# Patient Record
Sex: MALE | Race: WHITE
[De-identification: names, ages, dates, MRNs, and addresses within clinical notes are randomized per-mention and may not be internally consistent; named-entity substitution may affect disease eponyms.]

---

## 2018-08-11 ENCOUNTER — HOSPITAL ENCOUNTER (INPATIENT)
Dept: HOSPITAL 68 - ERH | Age: 73
LOS: 6 days | Discharge: HOME HEALTH | DRG: 854 | End: 2018-08-17
Admitting: INTERNAL MEDICINE
Payer: COMMERCIAL

## 2018-08-11 VITALS — HEIGHT: 70 IN | BODY MASS INDEX: 35.97 KG/M2 | WEIGHT: 251.25 LBS

## 2018-08-11 VITALS — DIASTOLIC BLOOD PRESSURE: 60 MMHG | SYSTOLIC BLOOD PRESSURE: 150 MMHG

## 2018-08-11 VITALS — SYSTOLIC BLOOD PRESSURE: 170 MMHG | DIASTOLIC BLOOD PRESSURE: 54 MMHG

## 2018-08-11 VITALS — DIASTOLIC BLOOD PRESSURE: 70 MMHG | SYSTOLIC BLOOD PRESSURE: 182 MMHG

## 2018-08-11 VITALS — SYSTOLIC BLOOD PRESSURE: 176 MMHG | DIASTOLIC BLOOD PRESSURE: 72 MMHG

## 2018-08-11 DIAGNOSIS — B95.61: ICD-10-CM

## 2018-08-11 DIAGNOSIS — G62.9: ICD-10-CM

## 2018-08-11 DIAGNOSIS — J45.909: ICD-10-CM

## 2018-08-11 DIAGNOSIS — Z79.84: ICD-10-CM

## 2018-08-11 DIAGNOSIS — D64.9: ICD-10-CM

## 2018-08-11 DIAGNOSIS — I11.0: ICD-10-CM

## 2018-08-11 DIAGNOSIS — F11.20: ICD-10-CM

## 2018-08-11 DIAGNOSIS — L02.11: ICD-10-CM

## 2018-08-11 DIAGNOSIS — I50.30: ICD-10-CM

## 2018-08-11 DIAGNOSIS — T40.2X5D: ICD-10-CM

## 2018-08-11 DIAGNOSIS — E11.9: ICD-10-CM

## 2018-08-11 DIAGNOSIS — M54.9: ICD-10-CM

## 2018-08-11 DIAGNOSIS — K59.03: ICD-10-CM

## 2018-08-11 DIAGNOSIS — A41.9: Primary | ICD-10-CM

## 2018-08-11 DIAGNOSIS — L03.221: ICD-10-CM

## 2018-08-11 DIAGNOSIS — E78.5: ICD-10-CM

## 2018-08-11 DIAGNOSIS — G89.29: ICD-10-CM

## 2018-08-11 LAB
ABSOLUTE GRANULOCYTE CT: 19.7 /CUMM (ref 1.4–6.5)
BASOPHILS # BLD: 0 /CUMM (ref 0–0.2)
BASOPHILS NFR BLD: 0.1 % (ref 0–2)
EOSINOPHIL # BLD: 0 /CUMM (ref 0–0.7)
EOSINOPHIL NFR BLD: 0.2 % (ref 0–5)
ERYTHROCYTE [DISTWIDTH] IN BLOOD BY AUTOMATED COUNT: 14.5 % (ref 11.5–14.5)
GRANULOCYTES NFR BLD: 88.9 % (ref 42.2–75.2)
HCT VFR BLD CALC: 41.9 % (ref 42–52)
LYMPHOCYTES # BLD: 1.2 /CUMM (ref 1.2–3.4)
MCH RBC QN AUTO: 28.4 PG (ref 27–31)
MCHC RBC AUTO-ENTMCNC: 33.3 G/DL (ref 33–37)
MCV RBC AUTO: 85.1 FL (ref 80–94)
MONOCYTES # BLD: 1.2 /CUMM (ref 0.1–0.6)
PLATELET # BLD: 339 /CUMM (ref 130–400)
PMV BLD AUTO: 7.4 FL (ref 7.4–10.4)
RED BLOOD CELL CT: 4.93 /CUMM (ref 4.7–6.1)
WBC # BLD AUTO: 22.2 /CUMM (ref 4.8–10.8)

## 2018-08-11 PROCEDURE — 2NBSP: CPT

## 2018-08-11 NOTE — RADIOLOGY REPORT
EXAMINATION:
XR PORTABLE CHEST
 
CLINICAL INFORMATION:
Question pneumonia. Elevated white blood cell count
 
COMPARISON:
12/23/2014
 
TECHNIQUE:
Portable frontal view of the chest was obtained.
 
FINDINGS: Limited examination by technique. Lordotic positioning. The right
costophrenic angle is excluded from the field-of-view.
The cardiac silhouette remains mildly enlarged.  There is slight elevation of
the right hemidiaphragm is partially obscured by the opacity on today's
examination. Otherwise, the lungs are clear. The left costophrenic angle
appears relatively sharp.
 
IMPRESSION:
 
1. Study limited by technique.
2. Possible pneumonia developing in the right lung base. Otherwise, clear
lungs.
3. Stable mild cardiomegaly.

## 2018-08-11 NOTE — ADMISSION CERTIFICATION
Admission Certification
 
Certification Statement
- As attending physician, I certify that at the time of
- admission, based on clinical presentation, severity of
- symptoms, need for further diagnostic testing and
- therapeutic interventions, and risk of adverse outcomes
- without in-hospital treatment, in my clinical assessment,
- this patient requires an acute hospital stay for a minimum
- of two nights or longer. I have also considered psychsocial
- factors such as support system, advanced age, financial
- issues, cognitive issues, and failed out-patient treatments,
- past re-admission history, safety of patient, and lack of
- compliance as applicable.
Specific rationale supporting this admission is:
Neck cellulitis failing outpatient treatment

## 2018-08-11 NOTE — ED GENERAL ADULT
History of Present Illness
 
General
Chief Complaint: Skin Rash/ Abcess
Stated Complaint: NECK PAIN, RED,WARM TO THE TOUCH.
Source: patient, family
Exam Limitations: no limitations
 
Vital Signs & Intake/Output
Vital Signs & Intake/Output
 Vital Signs
 
 
Date Time Temp Pulse Resp B/P B/P Pulse O2 O2 Flow FiO2
 
     Mean Ox Delivery Rate 
 
 1213 99.1 96 20 135/68  96 Room Air  
 
 
 
Allergies
Coded Allergies:
Penicillins (Intermediate, COUGH AND CONGESTION 18)
codeine (Intermediate, RASH 18)
 
Reconcile Medications
Amlodipine (Norvasc 5MG Tab) 5 MG TABLET   0.5 TAB PO DAILY HTN  (Reported)
Aspirin 81 MG TAB.CHEW   1 TAB PO DAILY HEART HEALTH  (Reported)
Atorvastatin Calcium (Lipitor) 10 MG TABLET   1 TAB PO DAILY CHOLESTEROL  (
Reported)
Diazepam 5 MG TABLET   2 TAB PO DAILY CHRONIC PAIN  (Reported)
Fenofibric Acid (Trilipix 135 MG CAP) 135 MG CAPSULE.DR   145 MG PO DAILY 
CHOLESTEROL  (Reported)
Furosemide (Lasix) 40 MG TABLET   40 MG PO Q12 HEART FAILURE
Glyburide 2.5 MG TABLET   2 TAB PO DAILY DAIBETES  (Reported)
LOSARTAN/HYDROCHLOROTHIAZIDE (Hyzaar 100-25 Tablet) 1 EACH TABLET   1 TAB PO 
DAILY HTN  (Reported)
Magnesium Oxide 400 MG TABLET   1 TAB PO DAILY SUPPLEMENT  (Reported)
METFORMIN HCL (Metformin) 1,000 MG TABLET   1 TAB PO BID DIABETES  (Reported)
Metoprolol Tartrate (Lopressor) 50 MG TAB   50 MG PO QAM HTN  (Reported)
Metoprolol Tartrate (Lopressor) 25 MG TABLET   25 MG PO 1700 HEART  (Reported)
Mometasone Furoate (Nasonex) 0.05 MG/Actuation SPR   2 PUF LISA DAILY ALLERGY  (
Reported)
Niacin 500 MG TAB   1,000 MG PO DAILY SUPPLEMENT  (Reported)
Omega-3-Acid Ethyl Esters (Lovaza) 1 GRAM CAPSULE   2 CAP PO BID SUPPLEMENT  (
Reported)
Omeprazole 20 MG CAPSULE.DR   1 TAB PO DAILY HEARTBURN  (Reported)
Oxycodone Cr (OxyContin) 20 MG TAB.ER.12H   1 TAB PO BID PAIN  (Reported)
OXYCODONE HCL (Oxycodone Hydrochloride) 10 MG TABLET   1 TAB PO BID PRN PAIN  (
Reported)
SENNOSIDES (Senokot) 8.6 MG TABLET   2 TAB PO QHS CONSTIPATION  (Reported)
Sildenafil Citrate (Viagra) 100 MG TAB   100 MG PO PRN AS NEEDED  (Reported)
 
Triage Note:
PT TO ED WITH WIFE FOR C/O CELLULITIS TO BACK OF
 NECK. PT CURRENTLY ON ABX FOR SAME.
 WIFE STATES PT HAS BEEN CONFUSED AS WELL.
 LARGE RED, WARM TO TOUCH AREA NOTED TO NECK.
 TEMP 99.1.
Triage Nurses Notes Reviewed? yes
HPI:
Patient noticed a red and painful area to the back of his neck last weekend.  
The redness hasn't spread.  He went to his dermatologist on Thursday and was 
diagnosed with cellulitis aspirin on Bactrim.  Patient has been taking the 
Bactrim however he began 1 fevers and the redness is worsening.  He describes a 
throbbing pain in the area where the redness is.  There is no pain outside of 
the reddened area.  There is no difficulty breathing or swallowing.  There is no
headache.
 
Past History
 
Travel History
Traveled to [place] past 21 day No
 
Medical History
Any Pertinent Medical History? see below for history
Cardiovascular: CHF, hypertension, hyperlipidemia
Respiratory: asthma
Gastrointestinal: GERD
Musculoskeletal: chronic back pain
Endocrine: diabetes
History of MRSA: No
History of VRE: No
History of CDIFF: No
Pneumonia Vaccine: 10/01/13
Influenza Vaccine: 06
Tetanus Vaccine: 13
 
Surgical History
Surgical History: non-contributory
 
Psychosocial History
Who do you live with Spouse
What is your primary language English
Tobacco Use: Quit >30 days ago
ETOH Use: denies use
Illicit Drug Use: denies illicit drug use
 
Family History
Family History, If Any:
MOTHER (Stroke). , Age 60+.
BROTHER (HTN).
FATHER (Heart Issues).
 
Hx Contributory? No
 
Review of Systems
 
Review of Systems
Constitutional:
Reports: see HPI, chills, fever. 
EENTM:
Reports: no symptoms. 
Respiratory:
Reports: no symptoms. 
Cardiovascular:
Reports: no symptoms. 
GI:
Reports: no symptoms. 
Genitourinary:
Reports: no symptoms. 
Musculoskeletal:
Reports: no symptoms. 
Skin:
Reports: see HPI. 
Neurological/Psychological:
Reports: no symptoms. 
Hematologic/Endocrine:
Reports: no symptoms. 
Immunologic/Allergic:
Reports: no symptoms. 
All Other Systems: Reviewed and Negative
 
Physical Exam
 
Physical Exam
General Appearance: well developed/nourished, alert, awake, mild distress
Head: atraumatic, normal appearance
Eyes:
Bilateral: PERRL, EOMI. 
Ears, Nose, Throat: normal pharynx, normal ENT inspection, hearing grossly 
normal
Neck: ERYTHEMA AND TENDER TO TOUCH
Respiratory: normal breath sounds, chest non-tender, no respiratory distress, 
lungs clear
Cardiovascular: regular rate/rhythm, normal peripheral pulses
Gastrointestinal: normal bowel sounds, soft, non-tender, no organomegaly
Back: normal inspection, normal range of motion
Extremities: normal inspection, normal capillary refill, normal range of motion,
no edema
Neurologic/Psych: no motor/sensory deficits, awake, alert, oriented x 3, normal 
gait, normal mood/affect
Skin: intact, normal color, warm/dry
 
Core Measures
ACS in differential dx? No
CVA/TIA Diagnosis: No
Sepsis Present: Yes
Sepsis Focused Exam Completed? Yes
 
ED Sepsis Exam
Date of Focused Sepsis Exam: 18
Time of Focused Sepsis Exam: 1405
Sepsis Cardiac Exam: Regular Rate/Rhythm
Sepsis Resp Exam: CTA
Sepsis Cap Refill Exam: <2 Sec
Sepsis Peripheral Pulse Exam: Normal
Sepsis Peripheral Pulse Location: Radial
Sepsis Skin Color Exam: Normal for Ethnicity
Skin Temp/Moisture Exam: Warm/Dry
 
Progress
Differential Diagnoses
I considered the following diagnoses in my evaluation of the patient: [
Cellulitis failed outpatient antibiotics]
 
Plan of Care:
 Orders
 
 
Procedure Date/time Status
 
LACTIC ACID  1516 Active
 
XRY-PORTABLE CHEST XRAY  1402 Active
 
EKG  1402 Active
 
BLOOD CULTURE  1216 Active
 
LACTIC ACID  1216 Complete
 
WESTERGREN SED RATE  1216 Complete
 
C-REACTIVE PROTEIN  1216 Complete
 
COMPREHENSIVE METABOLIC PANEL  1216 Complete
 
CBC WITHOUT DIFFERENTIAL  1216 Complete
 
 
 Laboratory Tests
 
 
18 1240:
Anion Gap 10, Estimated GFR > 60, BUN/Creatinine Ratio 21.0, Glucose 121  H, 
Lactic Acid 1.0, Calcium 9.3, Total Bilirubin 0.7, AST 18, ALT 31, Alkaline 
Phosphatase 79, C-Reactive Prot, Quant > 9.0  H, Total Protein 6.6, Albumin 3.6,
Globulin 3.0, Albumin/Globulin Ratio 1.2, CBC w Diff MAN DIFF ORDERED, RBC 4.93,
MCV 85.1, MCH 28.4, MCHC 33.3, RDW 14.5, MPV 7.4, Gran % 88.9  H, Lymphocytes % 
5.3  L, Monocytes % 5.5, Eosinophils % 0.2, Basophils % 0.1, Absolute 
Granulocytes 19.7  H, Absolute Lymphocytes 1.2, Absolute Monocytes 1.2  H, 
Absolute Eosinophils 0, Absolute Basophils 0, Normocytic RBCs VERIFIED, 
Normochromic RBCs VERIFIED, ESR Westergren 45  H
 Microbiology
 1356  BLOOD: Blood Culture - RECD
 1240  BLOOD: Blood Culture - RECD
 
 
Initial ED EKG: NSR, RBBB, nonspecific ST T wave chg
Prior EKG: unchanged
 
Departure
 
Departure
Disposition: STILL A PATIENT
Condition: Stable
Clinical Impression
Primary Impression: Cellulitis
Referrals:
Crispin Perdue MD (PCP/Family)
 
Departure Forms:
Customer Survey
General Discharge Information
 
Admission Note
Spoke With:
Guillermo Burgos MD
Documentation of Exam:
Documentation of any treatments & extenuating circumstances including Concerns 
Regarding Discharge (functional status, medication knowledge or non-compliance, 
living conditions, etc.) that warrant an admission rather than observation: [
Patient has been on antibiotics for 2 days for cellulitis to the back of his 
neck.  He continues to run fevers and the redness is worsening.  His continues 
to have the throbbing pain.  White count 22,000.  At this point he will require 
admission for IV antibiotics, ID consultation]
 
 
Critical Care Note
 
Critical Care Note
Critical Care Time: non-applicable

## 2018-08-11 NOTE — SEPSIS EVENT NOTE
**See Addendum**
Sepsis Event Note
 
Severe Sepsis
Severe Sepsis Present: No
 
Septic Shock
Septic Shock Present: No
 
Sepsis Focused Exam
Sepsis Cardiac Exam: Tachycardia
Sepsis Resp Exam: CTA
Sepsis Cap Refill Exam: <2 Sec
Sepsis Peripheral Pulse Exam: Normal
Sepsis Peripheral Pulse Location: Radial
Sepsis Skin Exam (color): Normal for Ethnicity
Skin Temp/Moisture Exam: Warm/Dry

## 2018-08-12 VITALS — SYSTOLIC BLOOD PRESSURE: 136 MMHG | DIASTOLIC BLOOD PRESSURE: 62 MMHG

## 2018-08-12 VITALS — DIASTOLIC BLOOD PRESSURE: 70 MMHG | SYSTOLIC BLOOD PRESSURE: 150 MMHG

## 2018-08-12 VITALS — DIASTOLIC BLOOD PRESSURE: 102 MMHG | SYSTOLIC BLOOD PRESSURE: 160 MMHG

## 2018-08-12 VITALS — SYSTOLIC BLOOD PRESSURE: 154 MMHG | DIASTOLIC BLOOD PRESSURE: 56 MMHG

## 2018-08-12 LAB
ABSOLUTE GRANULOCYTE CT: 15.9 /CUMM (ref 1.4–6.5)
BASOPHILS # BLD: 0.1 /CUMM (ref 0–0.2)
BASOPHILS NFR BLD: 0.5 % (ref 0–2)
EOSINOPHIL # BLD: 0.1 /CUMM (ref 0–0.7)
EOSINOPHIL NFR BLD: 0.8 % (ref 0–5)
ERYTHROCYTE [DISTWIDTH] IN BLOOD BY AUTOMATED COUNT: 14.4 % (ref 11.5–14.5)
GRANULOCYTES NFR BLD: 84.6 % (ref 42.2–75.2)
HCT VFR BLD CALC: 39.5 % (ref 42–52)
LYMPHOCYTES # BLD: 1.1 /CUMM (ref 1.2–3.4)
MCH RBC QN AUTO: 28.6 PG (ref 27–31)
MCHC RBC AUTO-ENTMCNC: 33.4 G/DL (ref 33–37)
MCV RBC AUTO: 85.6 FL (ref 80–94)
MONOCYTES # BLD: 1.5 /CUMM (ref 0.1–0.6)
PLATELET # BLD: 293 /CUMM (ref 130–400)
PMV BLD AUTO: 7.6 FL (ref 7.4–10.4)
RED BLOOD CELL CT: 4.62 /CUMM (ref 4.7–6.1)
WBC # BLD AUTO: 18.8 /CUMM (ref 4.8–10.8)

## 2018-08-12 PROCEDURE — 0J950ZZ DRAINAGE OF LEFT NECK SUBCUTANEOUS TISSUE AND FASCIA, OPEN APPROACH: ICD-10-PCS | Performed by: SURGERY

## 2018-08-12 NOTE — PN- HOUSESTAFF
**See Addendum**
Subjective
Follow-up For:
Neck cellulitis
Subjective:
Patient was seen and examined at bedside. He was alert and oriented but in 
moderate distress. He says the pain and the swelling is worse. The pain is 
throbbing and is the worst it has been since it started. He also complains of a 
throbbing  headache. He says the pain is a "15" as of now. The patient does have
a coded Penicillin yesterday. However, on interviewing the patient yesterday as 
well as today, he said that he did not have a rash or breathing issues with the 
Penicilllin. Last night, the patient complained of a "cold band like sensation" 
around his head. He denies doing cold compresses at that tiime. He was concerned
if it was an allergic reaction.  He was given Benadryl and slept well throughout
the night. He has no residual symptoms at this time.
 
Review of Systems
Constitutional:
Reports: see HPI. 
 
Objective
Last 24 Hrs of Vital Signs/I&O
 Vital Signs
 
 
Date Time Temp Pulse Resp B/P B/P Pulse O2 O2 Flow FiO2
 
     Mean Ox Delivery Rate 
 
08/12 0621 98.6 88 20 154/56  94   
 
08/11 2228    170/54     
 
08/11 2203 100.7 105 19 182/70  96 Room Air  
 
08/11 2132  104  176/72     
 
08/11 2059 99.0 102 18 176/72  94 Nasal 2.0L 
 
       Cannula  
 
08/11 2012       Room Air  
 
08/11 1852 99.3 79 19 150/60  96 Room Air  
 
08/11 1639 99.3 96 18 1476/67  96 Room Air  
 
08/11 1512       Room Air  
 
08/11 1445 100.8 91 18 140/63  93 Room Air  
 
08/11 1213 99.1 96 20 135/68  96 Room Air  
 
 
 Intake & Output
 
 
 08/12 1600 08/12 0800 08/12 0000
 
Intake Total  220 800
 
Output Total  300 800
 
Balance  -80 0
 
    
 
Intake, IV  100 200
 
Intake, Oral  120 600
 
Output, Urine  300 800
 
Patient  260 lb 255 lb
 
Weight   
 
Weight  Bed scale 
 
Measurement   
 
Method   
 
 
 
 
Physical Exam
General Appearance: Alert, Oriented X3, Cooperative, Moderate Distress
Skin: 10x8 cm area of erythema at the back of the neck, increased swelling and 
fluctuation, tender and warm to touch
Neck: Supple
Cardiovascular: Regular Rate, Normal S1, Normal S2, Murmur in the aortic area, 
possibly an AR
Lungs: Clear to Auscultation
Abdomen: Normal Bowel Sounds, Soft
Extremities: mild pedal edema
 
Assessment/Plan
Assessment:
73-year-old gentleman with past medical history of hypertension, anemia, CHF, 
asthma, GERD, chronic back pain, diabetes presents to the ED with posterior neck
cellulitis.  On examination he has a 10x 8 cm area of erythema, swelling at the 
back of his neck. The swelling and erythema is increased from yesterday. It is 
more tender and warm to touch. 
 
The patient is on high-dose opiate treatment at baseline for his chronic back 
pain.
 
The patient had a Tmax of 100.7 overnight. His blood pressure was 182/70 in the 
morning.
 
WBC today is 18.8. Lactic acid was 1.0 yesterday. 
 
PROBLEMS:
 
1.? Posterior neck cellulitis/carbuncle/abscess
2. Leukocytosis
3. Opiate dependance secondary to chronic pain
4. Diabetes
5. PMH of hypertension, anemia, CHF, asthma, GERD
 
Plan:
 
1. ?Cellulitis
-The patient is febrile in spite of antibiotic coverage. The WBC is down to 18.8
though.
-Swelling and erythema is worsening.
-We have placed a surgical consult for possible drainage of the carbuncle/
abscess
-We will trend CBC
-ID consult in the AM tomorrow
-We will monitor the patient for worsening of symptoms and watch out for spread 
to the pharyngeal spaces or the meninges
 
2. Diabetes
-Accu checks Q4 hours
- ISS
 
2. Contiue home meds for hypertension, CHF and chronic back pain
 
DVT prophylaxis 
Code status: Full code
Problem List:
 1. Cellulitis
 
 2. Leukocytosis
 
 3. Hypertension
 
Pain Rating: 10
Pain Location:
back of the neck
Pain Goal: Pain 4 or less
Pain Plan:
pathway
Tomorrow's Labs & Rationales:
cbc and bep

## 2018-08-12 NOTE — HISTORY & PHYSICAL PRE-OP
General Information and HPI
Source of Information: patient
History of Present Illness:
CC neck cellulits
HPI 
74 yo diabetic xsmoker c Hx of CHF on meds for chr back pain, one week of 
worsening back of neck redness, swelling, derm prescribed bactrim, still 
worsened, thick redness very stiff difficult to move his neck. Fever yesterday. 
He went to the dermatologist for skin lesions on his scalp several which were 
frozen off and he has a history of a skin cyst near his left earlobe to comes 
and goes but this redness in the middle of his neck in the back is new and there
was no underlying bump there.  He is admitted to medicine on IV Vanco
PFSH and ROS were reviewed, he denies any history of bleeding problems.
 
PSHx- back
 
Allergies/Medications
Allergies:
Coded Allergies:
Penicillins (Intermediate, COUGH AND CONGESTION 18)
codeine (Intermediate, RASH 18)
 
Home Med list
Amlodipine Besylate (Norvasc) 5 MG TABLET   1 TAB PO DAILY BP  (Reported)
Atorvastatin Calcium (Lipitor) 10 MG TABLET   1 TAB PO DAILY CHOLESTEROL  (
Reported)
Clotrimazole/Betamethasone Dip (Clotrimazole-Betamethasone Crm) 1 %-0.05 % 
CREAM..G.   1 SPEEDY TOP BID SKIN  (Reported)
     apply to affected area(s)
Empagliflozin (Jardiance) 10 MG TABLET   25 MG PO DAILY DIABETES  (Reported)
Folic Acid 1 MG TABLET   1 TAB PO DAILY VITAMIN  (Reported)
Furosemide (Lasix) 40 MG TABLET   1 TAB PO DAILY FLUID
Gabapentin (Neurontin) 300 MG CAPSULE   1 CAP PO SEE ADMIN CRITERIA NEUROPATHY  
(Reported)
     1 PO QAM
     1 PO QNOON
     2 PO QHS
Glimepiride 4 MG TABLET   1 TAB PO BID DIABETES  (Reported)
Metformin HCl (Metformin HCl ER) 500 MG TAB.ER.24   2 TAB PO BID DIABETES  (
Reported)
Metoprolol Tartrate (Lopressor) 50 MG TAB   50 MG PO QAM HTN  (Reported)
Metoprolol Tartrate (Lopressor) 25 MG TABLET   25 MG PO 1700 HEART  (Reported)
Olmesartan Medoxomil (Benicar) 40 MG TABLET   1 TAB PO DAILY BP  (Reported)
Omega-3 Acid Ethyl Esters (Lovaza) 1 GRAM CAPSULE   2 CAP PO QAM CHOLESTEROL  (
Reported)
Omeprazole 20 MG CAPSULE.DR   1 TAB PO DAILY HEARTBURN  (Reported)
Oxycodone Cr (OxyContin) 20 MG TAB.ER.12H   1 TAB PO BID PAIN  (Reported)
OXYCODONE HCL (Oxycodone Hydrochloride) 10 MG TABLET   1 TAB PO BID PRN PAIN  (
Reported)
Polyethylene Glycol 3350 (Miralax) 17 GRAM/DOSE POWDER   17 GM PO DAILY BOWEL  (
Reported)
     mix with water, juice, soda, coffee or tea
SENNOSIDES (Senokot) 8.6 MG TABLET   2 TAB PO QHS CONSTIPATION  (Reported)
Sildenafil Citrate (Viagra) 100 MG TAB   100 MG PO PRN AS NEEDED  (Reported)
 
 
Past History
 
Medical History
Blood Transfusion Hx: No
Neurological: NONE
Cardiovascular: CHF, hypertension, hyperlipidemia
Gastrointestinal: GERD
Musculoskeletal: chronic back pain
Endocrine: diabetes
History of MRSA: No
History of VRE: No
History of CDIFF: No
Isolation History: Standard
Pneumonia Vaccine: 10/01/13
Influenza Vaccine: 06
Tetanus Vaccine: 13
 
Surgical History
Pertinent Surgical History: non-contributory
 
Past Family/Social History
 
Family History
Relations & Conditions if any
MOTHER (Stroke). , Age 60+.
BROTHER (HTN).
FATHER (Heart Issues).
 
 
Psychosocial History
Where Do You Live? Home
Services at Home None
Smoking Status: Unknown If Ever Smoked
ETOH Use: denies use
Illicit Drug Use: denies illicit drug use
 
Review of Systems
Review of Systems:
Constitutional: No fever, sweats or weight loss
ENMT: No sore throat 
Cardiovascular: No chest pain, palpitations or leg swelling 
Respiratory: No shortness of breath, cough, or sputum or dyspnea on exertion
GI: No GERD or bleeding per rectum
: No dysuria or hematuria
Musculoskeletal: No new muscle weakness, bone or joint pain
Skin / Breast: No jaundice, rashes or itching
Psychiatric: No history of drug or alcohol abuse no depression or anxiety
Hematologic / lymphatic system: No problems with excessive bleeding, bruising, 
or blood clots
 
Exam & Diagnostic Data
Last 24 Hrs of Vital Signs/I&O
I rev Vital Signs
 
 
Date Time Temp Pulse Resp B/P B/P Pulse O2 O2 Flow FiO2
 
     Mean Ox Delivery Rate 
 
621 98.6 88 20 154/56  94   
 
2228    170/54     
 
2203 100.7 105 19 182/70  96 Room Air  
 
2132  104  176/72     
 
2059 99.0 102 18 176/72  94 Nasal 2.0L 
 
       Cannula  
 
2012       Room Air  
 
 1852 99.3 79 19 150/60  96 Room Air  
 
 1639 99.3 96 18 1476/67  96 Room Air  
 
 1512       Room Air  
 
 1445 100.8 91 18 140/63  93 Room Air  
 
 
I rev Intake & Output
 
 
  1600  0800 08 0000
 
Intake Total  220 800
 
Output Total  300 800
 
Balance  -80 0
 
    
 
Intake, IV  100 200
 
Intake, Oral  120 600
 
Output, Urine  300 800
 
Patient  260 lb 255 lb
 
Weight   
 
Weight  Bed scale 
 
Measurement   
 
Method   
 
 
 
Physical Exam:
Constitutional: pleasant, no acute distress, conversant
Eyes: sclera anicteric 
ENMT: ears and nose atraumatic, moist mucous membranes, good dentition, no lip 
lesions
Neck: Supple, trachea is midline, no cervical or supraclavicular adenopathy and 
no palpable thyromegaly 
 Posterior midline 8 cm area of thick induration with a central tiny drop of pus
 
Cardiovascular: S1, S2, no murmurs, no peripheral edema 
Respiratory: clear to auscultation with normal respiratory effort and no 
intercostal retractions 
GI: abdomen soft, nontender, nondistended, no palpable hepatosplenomegaly
Extremities / lymphatics: symmetrically warm, free range of motion no peripheral
edema, no cervical, supraclavicular, axillary, or inguinal adenopathy
Musculoskeletal: Did not evaluate gait and station, no digital cyanosis, good 
muscle strength and tone no atrophy, motor grossly 5 out of 5 throughout
Skin: no jaundice, no rashes warm, nondiaphoretic, no areas of erythema or 
induration
Psychiatric: mood and affect are appropriate and alert and oriented to person 
place and time
Last 24 Hrs of Labs/Jabier:
I rev Laboratory Tests
 
18 0600:
Anion Gap 11, Estimated GFR > 60, BUN/Creatinine Ratio 23.8, CBC w Diff NO MAN 
DIFF REQ, RBC 4.62  L, MCV 85.6, MCH 28.6, MCHC 33.4, RDW 14.4, MPV 7.6, Gran % 
84.6  H, Lymphocytes % 6.1  L, Monocytes % 8.0, Eosinophils % 0.8, Basophils % 
0.5, Absolute Granulocytes 15.9  H, Absolute Lymphocytes 1.1  L, Absolute 
Monocytes 1.5  H, Absolute Eosinophils 0.1, Absolute Basophils 0.1
 
18 1516:
Lactic Acid Cancelled
 
18 1240:
Anion Gap 10, Estimated GFR > 60, BUN/Creatinine Ratio 21.0, Glucose 121  H, 
Lactic Acid 1.0, Calcium 9.3, Total Bilirubin 0.7, AST 18, ALT 31, Alkaline 
Phosphatase 79, C-Reactive Prot, Quant > 9.0  H, Total Protein 6.6, Albumin 3.6,
Globulin 3.0, Albumin/Globulin Ratio 1.2, CBC w Diff MAN DIFF ORDERED, RBC 4.93,
MCV 85.1, MCH 28.4, MCHC 33.3, RDW 14.5, MPV 7.4, Gran % 88.9  H, Lymphocytes % 
5.3  L, Monocytes % 5.5, Eosinophils % 0.2, Basophils % 0.1, Absolute 
Granulocytes 19.7  H, Absolute Lymphocytes 1.2, Absolute Monocytes 1.2  H, 
Absolute Eosinophils 0, Absolute Basophils 0, Normocytic RBCs VERIFIED, 
Normochromic RBCs VERIFIED, ESR Westergren 45  H
 Microbiology
 1021  UPPER RESP: Surveillance Culture - COLB
 1356  BLOOD: Blood Culture - RES
 1240  BLOOD: Blood Culture - RES
 
 
 
Assessment/Plan
Assessment/Plan:
Impression is abscess neck, in patient with several risky comorbidities, it 
doesn't seem that there was a cyst there before which is the usual scenario but 
anyway this needs to be opened now we will do it at the bedside see below note. 
We discussed that these are often staged see how he heals and then consider 
excising the remnant but it's a rather large area and he has systemic signs of 
infection, risks include bleeding ongoing infection and recurrence.
 
Procedure note
 
Procedure is incision and drainage of neck abscess
Preoperative diagnosis neck abscess
Postop diagnosis is same
Attending Erick
Anesthesia is local lidocaine 1%
Procedure patient was positioned sitting the back of his neck was prepped and 
draped in the usual sterile fashion local anesthetic was injected in the center 
and transverse 1 cm incision was made ellipsing out the tiny necrotic pustular 
spot this was deepened initially there was no pus had to go through the deeper 
fascia with a hemostat to release the pus which was cultured this was irrigated 
with saline and the remnants of the lidocaine in the syringe a few times and 
when the effluent was clear we then awaked it opened covered it with gauze and 
tape.
Patient had the procedure well, change dressing tomorrow and await cultures.
 
 
 
As Ranked By This Provider
Problem List:
 1. Cellulitis and abscess of neck
 
 2. Diabetes

## 2018-08-13 VITALS — DIASTOLIC BLOOD PRESSURE: 70 MMHG | SYSTOLIC BLOOD PRESSURE: 150 MMHG

## 2018-08-13 VITALS — DIASTOLIC BLOOD PRESSURE: 90 MMHG | SYSTOLIC BLOOD PRESSURE: 172 MMHG

## 2018-08-13 VITALS — SYSTOLIC BLOOD PRESSURE: 160 MMHG | DIASTOLIC BLOOD PRESSURE: 72 MMHG

## 2018-08-13 LAB
ABSOLUTE GRANULOCYTE CT: 10.1 /CUMM (ref 1.4–6.5)
BASOPHILS # BLD: 0 /CUMM (ref 0–0.2)
BASOPHILS NFR BLD: 0.2 % (ref 0–2)
EOSINOPHIL # BLD: 0.3 /CUMM (ref 0–0.7)
EOSINOPHIL NFR BLD: 2.1 % (ref 0–5)
ERYTHROCYTE [DISTWIDTH] IN BLOOD BY AUTOMATED COUNT: 13.7 % (ref 11.5–14.5)
GRANULOCYTES NFR BLD: 81.9 % (ref 42.2–75.2)
HCT VFR BLD CALC: 39.9 % (ref 42–52)
LYMPHOCYTES # BLD: 1.2 /CUMM (ref 1.2–3.4)
MCH RBC QN AUTO: 28.9 PG (ref 27–31)
MCHC RBC AUTO-ENTMCNC: 33.8 G/DL (ref 33–37)
MCV RBC AUTO: 85.3 FL (ref 80–94)
MONOCYTES # BLD: 0.8 /CUMM (ref 0.1–0.6)
PLATELET # BLD: 298 /CUMM (ref 130–400)
PMV BLD AUTO: 7.1 FL (ref 7.4–10.4)
RED BLOOD CELL CT: 4.67 /CUMM (ref 4.7–6.1)
WBC # BLD AUTO: 12.4 /CUMM (ref 4.8–10.8)

## 2018-08-13 NOTE — CONS- INFECT DISEASE
General Information and HPI
 
Consulting Request
Date of Consult: 18
Requested By:
Marilia Aponte MD
 
Reason for Consult:
Posterior neck abscess/cellulitis
Source of Information: patient
Exam Limitations: poor historian
History of Present Illness:
This is a 73-year-old man with a history of diabetes, hypertension, CHF and 
chronic back pain, who developed a swelling and discomfort in the back of his 
neck 1 week prior to admission, with no documented fevers or chills, begun on 
Bactrim 5 days prior to admission with no response, admitted on  with 
increasing swelling and pain in the back of his neck and confusion.  On 
admission he was febrile to 100.8.  Laboratory data revealed a white blood cell 
count of 22,000, BUN/creatinine 21 and 1.0, with normal liver enzymes.  Chest x-
ray revealed a partially obscured right hemidiaphragm.  He was given a dose of 
Ceftriaxone, followed by a dose of Unasyn, after which he developed a cold 
feeling around his head, prompting a switch to Vancomycin.  On  he 
underwent an I&D of a neck abscess at the bedside by Surgery, with drainage of 
pus.  He has been afebrile for 36 hours and his white blood cell count is 
decreasing.  He does continue to note discomfort in the back of his neck but he 
is unable to provide a reliable history. 
 
 
Allergies/Medications
Allergies:
Coded Allergies:
Penicillins (Intermediate, COUGH AND CONGESTION 18)
codeine (Intermediate, RASH 18)
 
Home Med List:
Amlodipine Besylate (Norvasc) 5 MG TABLET   1 TAB PO DAILY BP  (Reported)
Atorvastatin Calcium (Lipitor) 10 MG TABLET   1 TAB PO DAILY CHOLESTEROL  (
Reported)
Clotrimazole/Betamethasone Dip (Clotrimazole-Betamethasone Crm) 1 %-0.05 % 
CREAM..G.   1 SPEEDY TOP BID SKIN  (Reported)
     apply to affected area(s)
Empagliflozin (Jardiance) 10 MG TABLET   25 MG PO DAILY DIABETES  (Reported)
Folic Acid 1 MG TABLET   1 TAB PO DAILY VITAMIN  (Reported)
Furosemide (Lasix) 40 MG TABLET   1 TAB PO DAILY FLUID
Gabapentin (Neurontin) 300 MG CAPSULE   1 CAP PO SEE ADMIN CRITERIA NEUROPATHY  
(Reported)
     1 PO QAM
     1 PO QNOON
     2 PO QHS
Glimepiride 4 MG TABLET   1 TAB PO BID DIABETES  (Reported)
Metformin HCl (Metformin HCl ER) 500 MG TAB.ER.24   2 TAB PO BID DIABETES  (
Reported)
Metoprolol Tartrate (Lopressor) 50 MG TAB   50 MG PO QAM HTN  (Reported)
Metoprolol Tartrate (Lopressor) 25 MG TABLET   25 MG PO 1700 HEART  (Reported)
Olmesartan Medoxomil (Benicar) 40 MG TABLET   1 TAB PO DAILY BP  (Reported)
Omega-3 Acid Ethyl Esters (Lovaza) 1 GRAM CAPSULE   2 CAP PO QAM CHOLESTEROL  (
Reported)
Omeprazole 20 MG CAPSULE.DR   1 TAB PO DAILY HEARTBURN  (Reported)
Oxycodone Cr (OxyContin) 20 MG TAB.ER.12H   1 TAB PO BID PAIN  (Reported)
OXYCODONE HCL (Oxycodone Hydrochloride) 10 MG TABLET   1 TAB PO BID PRN PAIN  (
Reported)
Polyethylene Glycol 3350 (Miralax) 17 GRAM/DOSE POWDER   17 GM PO DAILY BOWEL  (
Reported)
     mix with water, juice, soda, coffee or tea
SENNOSIDES (Senokot) 8.6 MG TABLET   2 TAB PO QHS CONSTIPATION  (Reported)
Sildenafil Citrate (Viagra) 100 MG TAB   100 MG PO PRN AS NEEDED  (Reported)
 
 
Past History
 
Travel History
Traveled to [place] past 21 day No
 
Medical History
Blood Transfusion Hx: No
Neurological: NONE
Cardiovascular: CHF, hypertension, hyperlipidemia
Gastrointestinal: GERD
Musculoskeletal: chronic back pain
Endocrine: diabetes
History of MRSA: No
History of VRE: No
History of CDIFF: No
Isolation History: Standard
Pneumonia Vaccine: 10/01/13
Influenza Vaccine: 06
Tetanus Vaccine: 13
 
Surgical History
Surgical History: non-contributory
 
Family History
Relations & Conditions If Any:
MOTHER (Stroke). , Age 60+.
BROTHER (HTN).
FATHER (Heart Issues).
 
 
Psychosocial History
Where Do You Live? Home
Services at Home: None
Smoking Status: Unknown If Ever Smoked
ETOH Use: denies use
Illicit Drug Use: denies illicit drug use
 
Review of Systems
 
Review of Systems
All Other Systems: Reviewed and Negative
 
Exam & Diagnostic Data
Last 24 Hrs of Vital Signs/I&O
 Vital Signs
 
 
Date Time Temp Pulse Resp B/P B/P Pulse O2 O2 Flow FiO2
 
     Mean Ox Delivery Rate 
 
 0915  85  150/70     
 
 0915  85  150/70     
 
 0913  85  150/70     
 
 0535 98.8 85 20 150/70  94 Room Air  
 
 2046 98.9 89 18 150/70  96 Room Air  
 
 1710  88  194/97     
 
 1447 99.2 93 18 160/102  93   
 
 1403 99.2 93  160/102     
 
 1403  93  160/102     
 
 1246  88  136/62     
 
 
 Intake & Output
 
 
  1600  0800  0000
 
Intake Total   250
 
Output Total   
 
Balance   250
 
    
 
Intake, IV   250
 
 
 
 
Physical Exam
Other Physical Findings:
He is awake and alert, mildly confused, but in no acute distress.  He is 
afebrile.  Skin reveals no rash.  HEENT exam is negative.  Neck is supple with 
no adenopathy; posterior swelling and induration, with mild erythema and 
tenderness to palpation, with purulent material expressed from the wound.  Lungs
are clear.  Heart regular rhythm with no murmur.  Abdomen is distended,  
nontender with positive bowel sounds.  Back no CVA tenderness.  Extremities no 
cyanosis, clubbing or edema.  Neuro is without focality.
 
Last 24 Hours of Lab Results:
 Laboratory Tests
 
 
 
 
 0850
 
Chemistry 
 
  Sodium (137 - 145 mmol/L) 136  L
 
  Potassium (3.5 - 5.1 mmol/L) 4.1
 
  Chloride (98 - 107 mmol/L) 100
 
  Carbon Dioxide (22 - 30 mmol/L) 28
 
  Anion Gap (5 - 16) 7
 
  BUN (9 - 20 mg/dL) 20
 
  Creatinine (0.7 - 1.2 mg/dL) 0.9
 
  Estimated GFR (>60 ml/min) > 60
 
  BUN/Creatinine Ratio (7 - 25 %) 22.2
 
Hematology 
 
  CBC w Diff NO MAN DIFF REQ
 
  WBC (4.8 - 10.8 /CUMM) 12.4  H
 
  RBC (4.70 - 6.10 /CUMM) 4.67  L
 
  Hgb (14.0 - 18.0 G/DL) 13.5  L
 
  Hct (42 - 52 %) 39.9  L
 
  MCV (80.0 - 94.0 FL) 85.3
 
  MCH (27.0 - 31.0 PG) 28.9
 
  MCHC (33.0 - 37.0 G/DL) 33.8
 
  RDW (11.5 - 14.5 %) 13.7
 
  Plt Count (130 - 400 /CUMM) 298
 
  MPV (7.4 - 10.4 FL) 7.1  L
 
  Gran % (42.2 - 75.2 %) 81.9  H
 
  Lymphocytes % (20.5 - 51.1 %) 9.3  L
 
  Monocytes % (1.7 - 9.3 %) 6.5
 
  Eosinophils % (0 - 5 %) 2.1
 
  Basophils % (0.0 - 2.0 %) 0.2
 
  Absolute Granulocytes (1.4 - 6.5 /CUMM) 10.1  H
 
  Absolute Lymphocytes (1.2 - 3.4 /CUMM) 1.2
 
  Absolute Monocytes (0.10 - 0.60 /CUMM) 0.8  H
 
  Absolute Eosinophils (0.0 - 0.7 /CUMM) 0.3
 
  Absolute Basophils (0.0 - 0.2 /CUMM) 0
 
 
 
Last 24 Hours of Jabier Results:
Blood cultures 2  negative
 
Neck culture  positive for Staph aureus
 
 
Diagnostic Data
Recent Imaging Findings:
Chest x-ray  reveals a partially obscured, mildly elevated right 
hemidiaphragm
 
 
Assessment/Plan
 
Assessment/Plan
Impression:
This is a 73-year-old man with a history of diabetes admitted on  with 
increased swelling and pain in the back of his neck for nearly 1 week, not 
responsive to empiric antibiotics as an outpatient, and confusion, found to be 
febrile with a leukocytosis, status post I&D yesterday, with the culture 
positive for Staph aureus.   
 
He appears to have a carbuncle/abscess with secondary cellulitis secondary to 
Staph aureus.  Given his history of diabetes the possibility of MRSA must be 
considered, and he can be continued on Vancomycin pending the final culture.  He
did undergo an I&D at the bedside yesterday, and his white blood cell count has 
decreased significantly; nevertheless he continues to have induration and 
purulent drainage and feel he may still require a more formal I&D.
 
Suggestion:
1.  Warm compresses to the back of his neck
2.  Surgical follow-up regarding the possible need for a more formal I&D
3.  Follow-up the final culture
4.  Continue Vancomycin pending above
 
 
 
Consult Acknowledgment
- Thank you for your consult request.

## 2018-08-13 NOTE — PN- HOUSESTAFF
**See Addendum**
Lorna Sandoval 18 0736:
Subjective
Follow-up For:
Posterior nerve access
Subjective:
The patient was seen and exmained at bedside. He says he has bene feleing a lot 
better after the procedure. He had some confusion overnight. He was apparently  
not oriented and wanted to " call the police". He was alert and oriented during 
my interaction with him in the morning. He reports that his gown had to be 
changed in the middle of the night since he was drenched in sweat. He denies any
fever or chills though. He says he has a slight band like headache which comes 
and goes. He does not report nausea, vomiting, dizziness, shortness of breath.
 
Review of Systems
Constitutional:
Reports: see HPI. 
 
Objective
Last 24 Hrs of Vital Signs/I&O
 Vital Signs
 
 
Date Time Temp Pulse Resp B/P B/P Pulse O2 O2 Flow FiO2
 
     Mean Ox Delivery Rate 
 
 0915  85  150/70     
 
 0915  85  150/70     
 
 0913  85  150/70     
 
 0535 98.8 85 20 150/70  94 Room Air  
 
 2046 98.9 89 18 150/70  96 Room Air  
 
 1710  88  194/97     
 
 1447 99.2 93 18 160/102  93   
 
 1403 99.2 93  160/102     
 
 1403  93  160/102     
 
 1246  88  136/62     
 
 
 Intake & Output
 
 
  1600  0800  0000
 
Intake Total   250
 
Output Total   
 
Balance   250
 
    
 
Intake, IV   250
 
 
 
 
Physical Exam
General Appearance: Alert, Oriented X3, Cooperative, No Acute Distress
Skin: No Rashes, Posterior neck abscess s/p I&D
Neck: Supple
Cardiovascular: Regular Rate, Normal S1, Normal S2, Diastolic murmur heard in 
the aortic region, presumably from aortic regurgitation
Lungs: Clear to Auscultation
 
Assessment/Plan
Assessment:
73-year-old gentleman with past medical history of hypertension, anemia, CHF, 
asthma, GERD, chronic back pain, diabetes presented  to the ED with posterior 
neck erythema and swelling. The swelling was fluctuant. Surgery was consulted 
who performed an I & D on him yesterday. He is doing much better today.
 
The patient is on high-dose opiate treatment at baseline for his chronic back 
pain.
 
The patient had a Tmax of 99.2 overnight. His blood pressure was 182/70 in the 
morning.
 
WBC today is 12.4 in the morning today. 
 
PROBLEMS:
 
1.? Posterior neck abscess s/p I&D
2. Leukocytosis (resolving)
3. Opiate dependance secondary to chronic pain
4. Diabetes
5. PMH of hypertension, anemia, CHF, asthma, GERD
 
Plan:
 
1. Posterior Neck Abscess
-The patient is afebrileThe WBC is down to 12.4
-He is s/p I & D of the abscess
-We will continue trending CBC
-ID consult appreciated. We will stop Unasyn. He is on Vancomycin Day 3.
-We will monitor the patient for worsening of symptoms and watch out for spread 
to the pharyngeal spaces or the meninges
 
2. Diabetes
-Accu checks Q4 hours
- ISS
 
2. Contiue home meds for hypertension, CHF and chronic back pain
 
DVT prophylaxis 
Code status: Full code
Problem List:
 1. Cellulitis and abscess of neck
 
 2. Hypertension
 
 3. Leukocytosis
 
Pain Ratin
Pain Location:
back of the neck
Pain Goal: Remain pain free
Pain Plan:
pathway
Tomorrow's Labs & Rationales:
cbc and bep
 
 
Julio Cesar Jacob 18 1550:
Attending MD Review Statement
 
Attending Statement
Attending MD Statement: examined this patient, discuss w/resident/PA/NP, agreed 
w/resident/PA/NP, reviewed EMR data (avail), discussed with nursing, discussed 
with case mgmt
Attending Assessment/Plan:
Posterior neck abscess/cellulitis with prelim cultures growing staph aureus- 
cont on iv vancomycin. 
f/u on final culture results. pt underwent I & D.

## 2018-08-14 VITALS — SYSTOLIC BLOOD PRESSURE: 152 MMHG | DIASTOLIC BLOOD PRESSURE: 74 MMHG

## 2018-08-14 VITALS — DIASTOLIC BLOOD PRESSURE: 78 MMHG | SYSTOLIC BLOOD PRESSURE: 150 MMHG

## 2018-08-14 VITALS — DIASTOLIC BLOOD PRESSURE: 82 MMHG | SYSTOLIC BLOOD PRESSURE: 122 MMHG

## 2018-08-14 VITALS — SYSTOLIC BLOOD PRESSURE: 160 MMHG | DIASTOLIC BLOOD PRESSURE: 80 MMHG

## 2018-08-14 LAB
ABSOLUTE GRANULOCYTE CT: 7 /CUMM (ref 1.4–6.5)
BASOPHILS # BLD: 0 /CUMM (ref 0–0.2)
BASOPHILS NFR BLD: 0.4 % (ref 0–2)
EOSINOPHIL # BLD: 0.3 /CUMM (ref 0–0.7)
EOSINOPHIL NFR BLD: 3.1 % (ref 0–5)
ERYTHROCYTE [DISTWIDTH] IN BLOOD BY AUTOMATED COUNT: 14.2 % (ref 11.5–14.5)
GRANULOCYTES NFR BLD: 74.4 % (ref 42.2–75.2)
HCT VFR BLD CALC: 40.3 % (ref 42–52)
LYMPHOCYTES # BLD: 1.3 /CUMM (ref 1.2–3.4)
MCH RBC QN AUTO: 28.4 PG (ref 27–31)
MCHC RBC AUTO-ENTMCNC: 33.9 G/DL (ref 33–37)
MCV RBC AUTO: 83.9 FL (ref 80–94)
MONOCYTES # BLD: 0.8 /CUMM (ref 0.1–0.6)
PLATELET # BLD: 332 /CUMM (ref 130–400)
PMV BLD AUTO: 7 FL (ref 7.4–10.4)
RED BLOOD CELL CT: 4.8 /CUMM (ref 4.7–6.1)
WBC # BLD AUTO: 9.4 /CUMM (ref 4.8–10.8)

## 2018-08-14 NOTE — PN- HOUSESTAFF
**See Addendum**
Subjective
Follow-up For:
Posterior Neck Abscess
Subjective:
Patient was seen and examined at bedside. He was scared that he has a "dangerous
bug", because he was put in isolation after the cultures from the abscess came 
back positive for Staph aureus. He was reassured and explained that it is a part
of the routine precaution. He took it well. He denies fever,chills, nausea, 
vomiting.
 
Review of Systems
Constitutional:
Reports: see HPI. 
 
Objective
Last 24 Hrs of Vital Signs/I&O
 Vital Signs
 
 
Date Time Temp Pulse Resp B/P B/P Pulse O2 O2 Flow FiO2
 
     Mean Ox Delivery Rate 
 
 1410 97.5 79 20 152/74  96 Room Air  
 
 0922  86  168/80     
 
 0922  86  168/80     
 
 0922  86  168/80     
 
 0628 97.5 87 18 160/80     
 
 0053 98.0 100 18 122/82  97   
 
 2130 98.2 93 18 172/90  95 Room Air  
 
 1730  80  148/75     
 
 
 Intake & Output
 
 
  1600  0800  0000
 
Intake Total  240 540
 
Output Total   
 
Balance  240 540
 
    
 
Intake, IV   300
 
Intake, Oral  240 240
 
 
 
 
Physical Exam
General Appearance: Alert, Oriented X3, Cooperative, No Acute Distress
Neck: Supple, posterior neck abscess actively draining after I&D
Cardiovascular: Regular Rate, Normal S1, Normal S2
Lungs: Clear to Auscultation
Abdomen: Normal Bowel Sounds, Soft, No Tenderness
 
Assessment/Plan
Assessment:
73-year-old gentleman with past medical history of hypertension, anemia, CHF, 
asthma, GERD, chronic back pain, diabetes presented  to the ED with posterior 
neck erythema and swelling. The swelling was fluctuant. Surgery was consulted 
who performed an I & D on him yesterday. He is doing much better today.
 
The patient is on high-dose opiate treatment at baseline for his chronic back 
pain.
 
He has been afebrile overnight.  
 
WBC today is 9.4 in the morning today. 
 
PROBLEMS:
 
1.? Posterior neck abscess s/p I&D
2. Leukocytosis (resolving)
3. Opiate dependance secondary to chronic pain
4. Diabetes
5.Sepsis (resolving)
6. PMH of hypertension, anemia, CHF, asthma, GERD
 
Plan:
 
1. Posterior Neck Abscess
-The patient is afebrile. His WBC is 9.4 today.
-He is s/p I & D of the abscess
-We will continue trending CBC.
-Culture of the aspirated fluid is growing Staph aureus
-ID consult appreciated. We will stop Unasyn. He is on Vancomycin Day 4.
-We will monitor the patient for worsening of symptoms and watch out for spread 
to the pharyngeal spaces or the meninges
 
2. Diabetes
-Accu checks Q4 hours
- ISS
 
3. Sepsis
-The patient had sepsis during his addimssion. However, it has been resolving si
nce.
 
4. Contiue home meds for hypertension, CHF and chronic back pain
 
DVT prophylaxis 
Code status: Full code
Problem List:
 1. Abscess, neck
 
 2. Hypertension
 
 3. Leukocytosis
 
Pain Ratin
Pain Location:
na
Pain Goal: Remain pain free
Pain Plan:
na
Tomorrow's Labs & Rationales:
cbc and bep

## 2018-08-14 NOTE — PN- INFECT DX
Subjective
Subjective:
Afebrile.  He feels improved with minimal discomfort in the posterior neck.
 
Objective
Last 24 Hrs of Vital Signs/I&O
 Vital Signs
 
 
Date Time Temp Pulse Resp B/P B/P Pulse O2 O2 Flow FiO2
 
     Mean Ox Delivery Rate 
 
08/14 0922  86  168/80     
 
08/14 0922  86  168/80     
 
08/14 0922  86  168/80     
 
08/14 0628 97.5 87 18 160/80     
 
08/14 0053 98.0 100 18 122/82  97   
 
08/13 2130 98.2 93 18 172/90  95 Room Air  
 
08/13 1730  80  148/75     
 
08/13 1450 98.4 75 20 160/72  96 Room Air  
 
 
 Intake & Output
 
 
 08/14 1600 08/14 0800 08/14 0000
 
Intake Total  240 540
 
Output Total   
 
Balance  240 540
 
    
 
Intake, IV   300
 
Intake, Oral  240 240
 
 
 
 
Physical Exam
Other Physical Findings:
He appears comfortable in no acute distress
Neck posterior wound with decreased erythema and induration, with purulent 
drainage still expressible, mildly tender to palpation
 
 
Results
Last 24 Hours of Lab Results:
 Laboratory Tests
 
 
 08/14 0652
 
Chemistry 
 
  Sodium (137 - 145 mmol/L) 136  L
 
  Potassium (3.5 - 5.1 mmol/L) 4.2
 
  Chloride (98 - 107 mmol/L) 100
 
  Carbon Dioxide (22 - 30 mmol/L) 27
 
  Anion Gap (5 - 16) 9
 
  BUN (9 - 20 mg/dL) 22  H
 
  Creatinine (0.7 - 1.2 mg/dL) 0.8
 
  Estimated GFR (>60 ml/min) > 60
 
  BUN/Creatinine Ratio (7 - 25 %) 27.5  H
 
Hematology 
 
  CBC w Diff NO MAN DIFF REQ
 
  WBC (4.8 - 10.8 /CUMM) 9.4
 
  RBC (4.70 - 6.10 /CUMM) 4.80
 
  Hgb (14.0 - 18.0 G/DL) 13.6  L
 
  Hct (42 - 52 %) 40.3  L
 
  MCV (80.0 - 94.0 FL) 83.9
 
  MCH (27.0 - 31.0 PG) 28.4
 
  MCHC (33.0 - 37.0 G/DL) 33.9
 
  RDW (11.5 - 14.5 %) 14.2
 
  Plt Count (130 - 400 /CUMM) 332
 
  MPV (7.4 - 10.4 FL) 7.0  L
 
  Gran % (42.2 - 75.2 %) 74.4
 
  Lymphocytes % (20.5 - 51.1 %) 14.0  L
 
  Monocytes % (1.7 - 9.3 %) 8.1
 
  Eosinophils % (0 - 5 %) 3.1
 
  Basophils % (0.0 - 2.0 %) 0.4
 
  Absolute Granulocytes (1.4 - 6.5 /CUMM) 7.0  H
 
  Absolute Lymphocytes (1.2 - 3.4 /CUMM) 1.3
 
  Absolute Monocytes (0.10 - 0.60 /CUMM) 0.8  H
 
  Absolute Eosinophils (0.0 - 0.7 /CUMM) 0.3
 
  Absolute Basophils (0.0 - 0.2 /CUMM) 0
 
 
 
Last 24 Hours of Jabier Results:
Neck culture August 12 positive for Staph aureus sensitive to Oxacillin
 
 
Assessment/Plan ID
Impression:
Improved, with his temperatures and white blood cell count now normal, on 
Vancomycin, Day 3 of treatment for an abscess in the posterior neck, status post
drainage at the bedside 2 days ago, with the culture positive for Staph aureus 
sensitive to Oxacillin.  His antibiotics can be adjusted taking into account his
remote Penicillin allergy, which is of unclear significance.
 
Suggestion:
1.  Warm compresses to the back of his neck
2.  Discontinue Vancomycin
3.  Begin Cefazolin 2 g IV every 8 hours

## 2018-08-15 VITALS — SYSTOLIC BLOOD PRESSURE: 133 MMHG | DIASTOLIC BLOOD PRESSURE: 71 MMHG

## 2018-08-15 VITALS — DIASTOLIC BLOOD PRESSURE: 76 MMHG | SYSTOLIC BLOOD PRESSURE: 156 MMHG

## 2018-08-15 VITALS — SYSTOLIC BLOOD PRESSURE: 126 MMHG | DIASTOLIC BLOOD PRESSURE: 62 MMHG

## 2018-08-15 LAB
ABSOLUTE GRANULOCYTE CT: 8.7 /CUMM (ref 1.4–6.5)
BASOPHILS # BLD: 0 /CUMM (ref 0–0.2)
BASOPHILS NFR BLD: 0.3 % (ref 0–2)
EOSINOPHIL # BLD: 0.3 /CUMM (ref 0–0.7)
EOSINOPHIL NFR BLD: 3.2 % (ref 0–5)
ERYTHROCYTE [DISTWIDTH] IN BLOOD BY AUTOMATED COUNT: 14 % (ref 11.5–14.5)
GRANULOCYTES NFR BLD: 78.8 % (ref 42.2–75.2)
HCT VFR BLD CALC: 42.2 % (ref 42–52)
LYMPHOCYTES # BLD: 1.1 /CUMM (ref 1.2–3.4)
MCH RBC QN AUTO: 28.7 PG (ref 27–31)
MCHC RBC AUTO-ENTMCNC: 33.7 G/DL (ref 33–37)
MCV RBC AUTO: 85.3 FL (ref 80–94)
MONOCYTES # BLD: 0.8 /CUMM (ref 0.1–0.6)
PLATELET # BLD: 393 /CUMM (ref 130–400)
PMV BLD AUTO: 7.2 FL (ref 7.4–10.4)
RED BLOOD CELL CT: 4.95 /CUMM (ref 4.7–6.1)
WBC # BLD AUTO: 11 /CUMM (ref 4.8–10.8)

## 2018-08-15 NOTE — PN- INFECT DX
Subjective
Subjective:
Afebrile.  He continues to report pain in the nape of his neck, with drainage.
 
Objective
Last 24 Hrs of Vital Signs/I&O
 Vital Signs
 
 
Date Time Temp Pulse Resp B/P B/P Pulse O2 O2 Flow FiO2
 
     Mean Ox Delivery Rate 
 
08/15 0830  76  132/80     
 
08/15 0631 98.2 80 20 133/71  95 Room Air  
 
08/14 2104 97.8 82 18 150/78  95 Room Air  
 
08/14 1410 97.5 79 20 152/74  96 Room Air  
 
 
 Intake & Output
 
 
 08/15 1600 08/15 0800 08/15 0000
 
Intake Total  340 240
 
Output Total   
 
Balance  340 240
 
    
 
Intake, IV  100 
 
Intake, Oral  240 240
 
Patient  251 lb 
 
Weight   
 
Weight  Bed scale 
 
Measurement   
 
Method   
 
 
 
 
Physical Exam
Other Physical Findings:
He appears comfortable in no acute distress
Neck posterior wound with minimal induration and erythema, mildly tender to 
palpation, with a small amount of drainage expressible
 
 
Results
Last 24 Hours of Lab Results:
 Laboratory Tests
 
 
 08/15
 
 0610
 
Chemistry 
 
  Sodium (137 - 145 mmol/L) 135  L
 
  Potassium (3.5 - 5.1 mmol/L) 4.4
 
  Chloride (98 - 107 mmol/L) 98
 
  Carbon Dioxide (22 - 30 mmol/L) 28
 
  Anion Gap (5 - 16) 9
 
  BUN (9 - 20 mg/dL) 21  H
 
  Creatinine (0.7 - 1.2 mg/dL) 0.8
 
  Estimated GFR (>60 ml/min) > 60
 
  BUN/Creatinine Ratio (7 - 25 %) 26.3  H
 
Hematology 
 
  CBC w Diff NO MAN DIFF REQ
 
  WBC (4.8 - 10.8 /CUMM) 11.0  H
 
  RBC (4.70 - 6.10 /CUMM) 4.95
 
  Hgb (14.0 - 18.0 G/DL) 14.2
 
  Hct (42 - 52 %) 42.2
 
  MCV (80.0 - 94.0 FL) 85.3
 
  MCH (27.0 - 31.0 PG) 28.7
 
  MCHC (33.0 - 37.0 G/DL) 33.7
 
  RDW (11.5 - 14.5 %) 14.0
 
  Plt Count (130 - 400 /CUMM) 393
 
  MPV (7.4 - 10.4 FL) 7.2  L
 
  Gran % (42.2 - 75.2 %) 78.8  H
 
  Lymphocytes % (20.5 - 51.1 %) 10.4  L
 
  Monocytes % (1.7 - 9.3 %) 7.3
 
  Eosinophils % (0 - 5 %) 3.2
 
  Basophils % (0.0 - 2.0 %) 0.3
 
  Absolute Granulocytes (1.4 - 6.5 /CUMM) 8.7  H
 
  Absolute Lymphocytes (1.2 - 3.4 /CUMM) 1.1  L
 
  Absolute Monocytes (0.10 - 0.60 /CUMM) 0.8  H
 
  Absolute Eosinophils (0.0 - 0.7 /CUMM) 0.3
 
  Absolute Basophils (0.0 - 0.2 /CUMM) 0
 
 
 
Last 24 Hours of Jabier Results:
Blood cultures 2 August 11 negative
 
 
Assessment/Plan ID
Impression:
Overall improved, with his temperatures remaining normal and his white blood 
cell count overall decreased, though slightly increased from yesterday, of 
unclear significance, now on Cefazolin, Day 4 of treatment for an abscess of the
posterior neck secondary to MSSA, status post drainage at the bedside 3 days ago
, with some drainage still expressible.
 
Suggestion:
1.  Warm compresses to the back of his neck
2.  Continue Cefazolin

## 2018-08-15 NOTE — DISCHARGE SUMMARY
Hospital Course
Allergies:
Coded Allergies:
Penicillins (Intermediate, COUGH AND CONGESTION 08/11/18)
codeine (Intermediate, RASH 08/11/18)
 
 
Discharge Instructions
 
Medications at Discharge
Discharge Medications:
The following medications have been changed:
Old:
Furosemide (Lasix) 40 MG TABLET
    40 Milligram ORAL EVERY 12 HOURS
    Days = 30
 
New:
Furosemide (Lasix) 40 MG TABLET
    1 Tablet ORAL DAILY
    Qty = 30

## 2018-08-15 NOTE — PN- HOUSESTAFF
Lorna Sandoval 08/15/18 0743:
Subjective
Follow-up For:
Posterior neck abscess
Subjective:
Patient was seen and examined at  bedside. He has no active concerns. He does 
report that he woke up at 4 am because of a 7/10 pain in his neck which subsided
with medication. He denies fever, chills, nausea, vomiting, chest pain or 
shortness of breath.
 
Review of Systems
Constitutional:
Reports: see HPI. 
 
Objective
Last 24 Hrs of Vital Signs/I&O
 Vital Signs
 
 
Date Time Temp Pulse Resp B/P B/P Pulse O2 O2 Flow FiO2
 
     Mean Ox Delivery Rate 
 
08/15 1353 98.4 62 18 126/62  95 Room Air  
 
08/15 0830  76  132/80     
 
08/15 0631 98.2 80 20 133/71  95 Room Air  
 
 2104 97.8 82 18 150/78  95 Room Air  
 
 
 Intake & Output
 
 
 08/15 1600 08/15 0800 08/15 0000
 
Intake Total 950 340 240
 
Output Total   
 
Balance 950 340 240
 
    
 
Intake, IV 50 100 
 
Intake, Oral 900 240 240
 
Number 0  
 
Bowel   
 
Movements   
 
Patient  251 lb 
 
Weight   
 
Weight  Bed scale 
 
Measurement   
 
Method   
 
 
 
 
Physical Exam
General Appearance: Alert, Oriented X3, Cooperative, No Acute Distress
Skin: psoterior neck abscess packed and dressed
Neck: Supple
Cardiovascular: Regular Rate, Normal S1, Normal S2, Diastolic murmur
Lungs: Clear to Auscultation
Abdomen: Normal Bowel Sounds
 
Assessment/Plan
Assessment:
73-year-old gentleman with past medical history of hypertension, anemia, CHF, 
asthma, GERD, chronic back pain, diabetes presented  to the ED with posterior 
neck erythema and swelling. The swelling was fluctuant. Surgery was consulted 
who performed an I & D on him yesterday. He is doing much better today. He is 
pain free with anlgesics but still has a 7/10 pain when the medications wear 
off.
 
The patient is on high-dose opiate treatment at baseline for his chronic back 
pain.
 
The patient was afebrile overnight. His blood pressure was 182/70 in the 
morning.
 
WBC today is 9.4 in the morning today. 
 
PROBLEMS:
 
1. Posterior neck abscess s/p I&D
2. Leukocytosis
3. Opiate dependance secondary to chronic pain
4. Diabetes
5. PMH of hypertension, anemia,  asthma, GERD
6. HIstory of chronic Diastolic congestive heart failure
 
Plan:
 
1. Posterior Neck Abscess
-The patient is afebrile. His white count is 9.4 today.
-He is s/p I & D of the abscess. 
-We will continue trending CBC
-ID consult appreciated. The patient was started on Cefazolin yesterday after 
the head and neck cultures were found to grow pan-sensitive Staph aureus.
-We will monitor the patient for worsening of symptoms and watch out for spread 
to the pharyngeal spaces or the meninges
 
2. Diabetes
-Accu checks Q4 hours
- ISS
 
.
 
3. Contiue home meds for hypertension, CHF and chronic back pain
 
DVT prophylaxis 
Code status: Full code
Problem List:
 1. Abscess, neck
 
 2. Leukocytosis
 
 3. Hypertension
 
 4. Chronic diastolic (congestive) heart failure
 
Pain Ratin
Pain Location:
back of the neck
Pain Goal: Remain pain free
Pain Plan:
pathway
Tomorrow's Labs & Rationales:
shelly
 
 
Marilia Aponte 08/15/18 1036:
Attending MD Review Statement
 
Attending Statement
Attending MD Statement: examined this patient, discuss w/resident/PA/NP, agreed 
w/resident/PA/NP, discussed with family, reviewed EMR data (avail), discussed 
with nursing, discussed with case mgmt, reviewed images, amended to note
Attending Assessment/Plan:
Overnight no new complaints. His WBC is slightly elevated today. Posterior neck 
abscess/cellulitis with cultures growing MSSA- continue iv cefazolin per ID 
recommendations. lowell pt the care plan. Anticipate discharge in next 24 hrs.

## 2018-08-16 VITALS — SYSTOLIC BLOOD PRESSURE: 142 MMHG | DIASTOLIC BLOOD PRESSURE: 79 MMHG

## 2018-08-16 VITALS — SYSTOLIC BLOOD PRESSURE: 150 MMHG | DIASTOLIC BLOOD PRESSURE: 70 MMHG

## 2018-08-16 VITALS — SYSTOLIC BLOOD PRESSURE: 152 MMHG | DIASTOLIC BLOOD PRESSURE: 77 MMHG

## 2018-08-16 LAB
ABSOLUTE GRANULOCYTE CT: 7.1 /CUMM (ref 1.4–6.5)
BASOPHILS # BLD: 0 /CUMM (ref 0–0.2)
BASOPHILS NFR BLD: 0.4 % (ref 0–2)
EOSINOPHIL # BLD: 0.2 /CUMM (ref 0–0.7)
EOSINOPHIL NFR BLD: 2.2 % (ref 0–5)
ERYTHROCYTE [DISTWIDTH] IN BLOOD BY AUTOMATED COUNT: 13.9 % (ref 11.5–14.5)
GRANULOCYTES NFR BLD: 76.3 % (ref 42.2–75.2)
HCT VFR BLD CALC: 40.4 % (ref 42–52)
LYMPHOCYTES # BLD: 1.2 /CUMM (ref 1.2–3.4)
MCH RBC QN AUTO: 28.4 PG (ref 27–31)
MCHC RBC AUTO-ENTMCNC: 33.5 G/DL (ref 33–37)
MCV RBC AUTO: 85 FL (ref 80–94)
MONOCYTES # BLD: 0.7 /CUMM (ref 0.1–0.6)
PLATELET # BLD: 352 /CUMM (ref 130–400)
PMV BLD AUTO: 6.9 FL (ref 7.4–10.4)
RED BLOOD CELL CT: 4.76 /CUMM (ref 4.7–6.1)
WBC # BLD AUTO: 9.3 /CUMM (ref 4.8–10.8)

## 2018-08-16 NOTE — PN- GENERAL SURGERY
Surgical Brief Attending Note
Brief Attending Note:
Examined cellulitis abscess incision given the original intensity it's healing 
appropriately, it is clearly not worsening, these can certainly drain for over a
week, no surgical concerns continue antibiotics.

## 2018-08-16 NOTE — PN- HOUSESTAFF
Lorna Sandoval 18 0733:
Subjective
Follow-up For:
Neck abscess
Subjective:
Patient was seen and examined at bedside. He still has pain. He was sitting in 
the chair with his hands around his neck  waiting for the "pain meds to kick in
". He states that the pain is well controlled with the pain meds. He is very 
concerned about the continual drainage from the abscess. He denies fever, chills
, nausea, vomiting, shortness of breath, cough.
 
Review of Systems
Constitutional:
Reports: see HPI. 
 
Objective
Last 24 Hrs of Vital Signs/I&O
 Vital Signs
 
 
Date Time Temp Pulse Resp B/P B/P Pulse O2 O2 Flow FiO2
 
     Mean Ox Delivery Rate 
 
 1647  64  150/70     
 
 0657 98.8 89 20 142/79  95 Room Air  
 
08/15 2207 98.4 64 20 156/76  96 Room Air  
 
 
 Intake & Output
 
 
  1600  0800  0000
 
Intake Total 1210 290 570
 
Output Total   425
 
Balance 1210 290 145
 
    
 
Intake,  50 70
 
Intake, Oral 1100 240 500
 
Number   0
 
Bowel   
 
Movements   
 
Output, Urine   425
 
Patient 258 lb 260 lb 
 
Weight   
 
Weight  Bed scale 
 
Measurement   
 
Method   
 
 
 
 
Physical Exam
General Appearance: Alert, Oriented X3, Cooperative, No Acute Distress
Skin: No Rashes, Abscess on the posterior neck, actively draining
Cardiovascular: Regular Rate, Normal S1, Normal S2
Lungs: Clear to Auscultation
Abdomen: Normal Bowel Sounds, Soft, No Tenderness
Extremities: No Edema, Normal Pulses
 
Assessment/Plan
Assessment:
73-year-old gentleman with past medical history of hypertension, anemia, CHF, 
asthma, GERD, chronic back pain, diabetes presented  to the ED with posterior 
neck erythema and swelling. The swelling was fluctuant. He still has pain in his
neck which radiates up his head.
 
The patient is on high-dose opiate treatment at baseline for his chronic back 
pain.
 
The patient was afebrile overnight. His blood pressure was 170/80 in the 
morning.
 
WBC today is 9.3 in the morning today. 
 
PROBLEMS:
 
1. Posterior neck abscess s/p I&D
2. Leukocytosis
3. Opiate dependance secondary to chronic pain
4. Diabetes
5. PMH of hypertension, anemia,  asthma, GERD
6. HIstory of chronic Diastolic congestive heart failure
 
Plan:
 
1. Posterior Neck Abscess
-The patient is afebrile. His white count is 9.3 today.
-He is s/p I & D of the abscess. 
-ID consult appreciated. The patient was started on Cefazolin after the head and
neck cultures were found to grow pan-sensitive Staph aureus. We will discharge 
the patient on Keflex 500mg BID for 5 days.
-We will monitor the patient for worsening of symptoms and watch out for spread 
to the pharyngeal spaces or the meninges
 
2. Diabetes
-Accu checks Q4 hours
- ISS
 
.
 
3. Contiue home meds for hypertension, CHF and chronic back pain
DVT prophylaxis
Code status: Full code
Problem List:
 1. Chronic diastolic (congestive) heart failure
 
 2. Abscess, neck
 
 3. Hypertension
 
 4. Leukocytosis
 
Pain Ratin
Pain Location:
Back of the neck
Pain Goal: Remain pain free
Pain Plan:
pathway
Tomorrow's Labs & Rationales:
Marilia Pizarro 18 1150:
Attending MD Review Statement
 
Attending Statement
Attending MD Statement: examined this patient, discuss w/resident/PA/NP, agreed 
w/resident/PA/NP, discussed with family, reviewed EMR data (avail), discussed 
with nursing, discussed with case mgmt, reviewed images, amended to note
Attending Assessment/Plan:
Overnight still some drainage. Surgery is infomed. Dressings changed. His WBC is
stabilising. Posterior neck abscess/cellulitis with cultures growing MSSA- 
continue iv cefazolin per ID recommendations. lowell pt the care plan. Anticipate 
discharge.

## 2018-08-16 NOTE — PATIENT DISCHARGE INSTRUCTIONS
Discharge Instructions
 
General Discharge Information
You were seen/treated for:
Cellulitis Neck Abscess
Special Instructions:
1. Please schedule an appointment with  within a week of your 
discharge.
2. Please schedule an appointment with your PCP within a wee of your discharge.
 
Acute Coronary Syndrome
 
Inclusion Criteria
At DC or during hospital stay patient has or had the following:
ACS DIAGNOSIS No
 
Discharge Core Measures
Meds if any: Prescribed or Continued at Discharge
Meds if any: NOT Prescribed or Continued at Discharge
 
Congestive Heart Failure
 
Inclusion Criteria
At DC or during hospital stay patient has or had the following:
CHF DIAGNOSIS No
 
Discharge Core Measures
Meds if any: Prescribed or Continued at Discharge
Meds if any: NOT Prescribed or Continued at Discharge
 
Cerebrovascular accident
 
Inclusion Criteria
At DC or during hospital stay patient has or had the following:
CVA/TIA Diagnosis No
 
Discharge Core Measures
Meds if any: Prescribed or Continued at Discharge
Meds if any: NOT Prescribed or Continued at Discharge
 
Venous thromboembolism
 
Inclusion Criteria
VTE Diagnosis No
VTE Type NONE
VTE Confirmed by (Test) NONE
 
Discharge Core Measures
- Per Current guidelines, there needs to be overlap
- treatment for the first 5 days of Warfarin therapy.
- If discharged on Warfarin prior to 5 days of
- overlap therapy, the patient will need to be
- assessed for post discharge needs including
- *Post discharge parental anticoagulation
- *Warfarin and/or parental anticoagulation education
- *Follow up date to check INR post discharge
At least 5 days overlap therapy as Inpatient No
Meds if any: Prescribed or Continued at Discharge
Note: Overlap Therapy is Warfarin and Anticoagulant
Meds if any: NOT Prescribed or Continued at Discharge

## 2018-08-16 NOTE — PN- INFECT DX
Subjective
Subjective:
Afebrile.  He still notes discomfort and intermittent drainage from his neck 
wound.
 
Objective
Last 24 Hrs of Vital Signs/I&O
 Vital Signs
 
 
Date Time Temp Pulse Resp B/P B/P Pulse O2 O2 Flow FiO2
 
     Mean Ox Delivery Rate 
 
08/16 0657 98.8 89 20 142/79  95 Room Air  
 
08/15 2207 98.4 64 20 156/76  96 Room Air  
 
08/15 1634  60  160/80     
 
08/15 1353 98.4 62 18 126/62  95 Room Air  
 
 
 Intake & Output
 
 
 08/16 1600 08/16 0800 08/16 0000
 
Intake Total 900 290 570
 
Output Total   425
 
Balance 900 290 145
 
    
 
Intake,  50 70
 
Intake, Oral 800 240 500
 
Number   0
 
Bowel   
 
Movements   
 
Output, Urine   425
 
Patient 258 lb 260 lb 
 
Weight   
 
Weight  Bed scale 
 
Measurement   
 
Method   
 
 
 
 
Physical Exam
Other Physical Findings:
He appears comfortable in no acute distress
Neck posterior wound with minimal induration and erythema, slightly tender to 
palpation, with purulent drainage still able to be expressed
 
 
Results
Last 24 Hours of Lab Results:
 Laboratory Tests
 
 
 08/16
 
 0608
 
Hematology 
 
  CBC w Diff NO MAN DIFF REQ
 
  WBC (4.8 - 10.8 /CUMM) 9.3
 
  RBC (4.70 - 6.10 /CUMM) 4.76
 
  Hgb (14.0 - 18.0 G/DL) 13.5  L
 
  Hct (42 - 52 %) 40.4  L
 
  MCV (80.0 - 94.0 FL) 85.0
 
  MCH (27.0 - 31.0 PG) 28.4
 
  MCHC (33.0 - 37.0 G/DL) 33.5
 
  RDW (11.5 - 14.5 %) 13.9
 
  Plt Count (130 - 400 /CUMM) 352
 
  MPV (7.4 - 10.4 FL) 6.9  L
 
  Gran % (42.2 - 75.2 %) 76.3  H
 
  Lymphocytes % (20.5 - 51.1 %) 13.0  L
 
  Monocytes % (1.7 - 9.3 %) 8.1
 
  Eosinophils % (0 - 5 %) 2.2
 
  Basophils % (0.0 - 2.0 %) 0.4
 
  Absolute Granulocytes (1.4 - 6.5 /CUMM) 7.1  H
 
  Absolute Lymphocytes (1.2 - 3.4 /CUMM) 1.2
 
  Absolute Monocytes (0.10 - 0.60 /CUMM) 0.7  H
 
  Absolute Eosinophils (0.0 - 0.7 /CUMM) 0.2
 
  Absolute Basophils (0.0 - 0.2 /CUMM) 0
 
 
 
Last 24 Hours of Jabier Results:
Blood cultures 2 August 11 remain negative
 
 
Assessment/Plan ID
Impression:
Overall improved, with his temperatures and white blood cell count normal, on 
Cefazolin, Day 5 of treatment for a posterior neck abscess secondary to MSSA, 
status post drainage at the bedside 4 days ago, with some drainage still 
expressible.
 
Suggestion:
1.  Surgical follow-up
2.  Warm compresses to the back of his neck
3.  Continue Cefazolin pending above

## 2018-08-17 VITALS — DIASTOLIC BLOOD PRESSURE: 88 MMHG | SYSTOLIC BLOOD PRESSURE: 142 MMHG

## 2018-08-17 VITALS — SYSTOLIC BLOOD PRESSURE: 154 MMHG | DIASTOLIC BLOOD PRESSURE: 97 MMHG

## 2018-08-17 NOTE — PN- INFECT DX
Subjective
Subjective:
Afebrile.  He feels improved though still notes mild discomfort in the back of 
his neck.
 
Objective
Last 24 Hrs of Vital Signs/I&O
 Vital Signs
 
 
Date Time Temp Pulse Resp B/P B/P Pulse O2 O2 Flow FiO2
 
     Mean Ox Delivery Rate 
 
08/17 0839  80  142/88     
 
08/17 0700 98.5 71 20 154/97  98 Room Air  
 
08/16 2210 98.1 69 20 152/77  98 Room Air  
 
08/16 1647  64  150/70     
 
08/16 1600 97.9 64 18 150/70  94 Room Air  
 
 
 Intake & Output
 
 
 08/17 1600 08/17 0800 08/17 0000
 
Intake Total  130 500
 
Output Total  250 
 
Balance  -120 500
 
    
 
Intake, IV  10 
 
Intake, Oral  120 500
 
Output, Urine  250 
 
Patient  251 lb 
 
Weight   
 
Weight  Bed scale 
 
Measurement   
 
Method   
 
 
 
 
Physical Exam
Other Physical Findings:
He appears comfortable in no acute distress
Neck posterior wound with minimal surrounding induration, minimally tender to 
palpation, with no expressible drainage
 
 
Results
Last 24 Hours of Lab Results:
No labs from today
 
Last 24 Hours of Jabier Results:
No recent cultures
 
 
Assessment/Plan ID
Impression:
Continued improvement, with his temperatures and white blood cell count 
remaining normal, on Cefazolin, Day 6 of treatment for a posterior neck abscess 
secondary to MSSA, status post drainage at the bedside 5 days ago.  Surgical 
follow-up noted and appreciated.
 
Suggestion:
1.  Continue warm compresses to the back of his neck
2.  Discontinue Cefazolin
3.  Begin Keflex 500 mg p.o. every 6 hours for 4 more days

## 2018-08-17 NOTE — PN- HOUSESTAFF
Lorna Sandoval 18 0726:
Subjective
Follow-up For:
Posterior Neck abscess
Subjective:
Patient was seen and examined at bedside. Denies fever, chills, nausea, 
vomiting. He is looking forward to going home today.
 
Review of Systems
Constitutional:
Reports: see HPI. 
 
Objective
Last 24 Hrs of Vital Signs/I&O
 Vital Signs
 
 
Date Time Temp Pulse Resp B/P B/P Pulse O2 O2 Flow FiO2
 
     Mean Ox Delivery Rate 
 
 0839  80  142/88     
 
 0700 98.5 71 20 154/97  98 Room Air  
 
 
 
 
Physical Exam
General Appearance: Alert, Oriented X3, Cooperative, No Acute Distress
Neck: Supple
Cardiovascular: Regular Rate, Normal S1, Normal S2
Lungs: Clear to Auscultation
Abdomen: Normal Bowel Sounds, Soft
Extremities: No Edema, Normal Pulses
 
Assessment/Plan
Assessment:
73-year-old gentleman with past medical history of hypertension, anemia, CHF, 
asthma, GERD, chronic back pain, diabetes presented  to the ED with posterior 
neck erythema and swelling. The swelling was fluctuant. He still has pain in his
neck which radiates up his head.
 
The patient is on high-dose opiate treatment at baseline for his chronic back 
pain.
 
The patient was afebrile overnight. His blood pressure was 170/80 in the 
morning.
 
WBC today is 9.3 in the morning today. 
 
PROBLEMS:
 
1. Posterior neck abscess s/p I&D
2. Leukocytosis
3. Opiate dependance secondary to chronic pain
4. Diabetes
5. PMH of hypertension, anemia,  asthma, GERD
6. HIstory of chronic Diastolic congestive heart failure
 
Plan:
 
1. Posterior Neck Abscess
-The patient is afebrile. His white count is 9.3 today.
-He is s/p I & D of the abscess. 
-ID consult appreciated. The patient was started on Cefazolin after the head and
neck cultures were found to grow pan-sensitive Staph aureus. We will discharge 
the patient on Keflex 500mg BID for 5 days.
-We will monitor the patient for worsening of symptoms and watch out for spread 
to the pharyngeal spaces or the meninges
 
2. Diabetes
-Accu checks Q4 hours
- ISS
 
The patient is being discharged home on Keflex 500mg QID today.
 
DVT prophylaxis
Code status: Full code
Problem List:
 1. Chronic diastolic (congestive) heart failure
 
 2. Abscess, neck
 
 3. Leukocytosis
 
Pain Ratin
Pain Location:
na
Pain Goal: Remain pain free
Pain Plan:
na
Tomorrow's Labs & Rationales:
cammy
 
 
Marilia Aponte 18 1029:
Attending MD Review Statement
 
Attending Statement
Attending MD Statement: examined this patient, discuss w/resident/PA/NP, agreed 
w/resident/PA/NP, discussed with family, reviewed EMR data (avail), discussed 
with nursing, discussed with case mgmt, reviewed images, amended to note
Attending Assessment/Plan:
Overnight no drainage reported by patient. Surgery recommend to continue with 
antibitoics. Posterior neck abscess/cellulitis with cultures growing MSSA- 
Transitioned to PO abx as per ID. lowell pt the care plan. Anticipate discharge 
today.